# Patient Record
Sex: MALE | Race: WHITE | NOT HISPANIC OR LATINO | Employment: OTHER | URBAN - METROPOLITAN AREA
[De-identification: names, ages, dates, MRNs, and addresses within clinical notes are randomized per-mention and may not be internally consistent; named-entity substitution may affect disease eponyms.]

---

## 2018-05-03 ENCOUNTER — OFFICE VISIT (OUTPATIENT)
Dept: FAMILY MEDICINE CLINIC | Facility: CLINIC | Age: 73
End: 2018-05-03
Payer: MEDICARE

## 2018-05-03 VITALS
RESPIRATION RATE: 16 BRPM | HEART RATE: 72 BPM | HEIGHT: 66 IN | BODY MASS INDEX: 27.19 KG/M2 | WEIGHT: 169.2 LBS | SYSTOLIC BLOOD PRESSURE: 124 MMHG | DIASTOLIC BLOOD PRESSURE: 62 MMHG

## 2018-05-03 DIAGNOSIS — H25.89 OTHER AGE-RELATED CATARACT OF BOTH EYES: Primary | Chronic | ICD-10-CM

## 2018-05-03 PROCEDURE — 99204 OFFICE O/P NEW MOD 45 MIN: CPT | Performed by: FAMILY MEDICINE

## 2018-05-03 PROCEDURE — 93000 ELECTROCARDIOGRAM COMPLETE: CPT | Performed by: FAMILY MEDICINE

## 2018-05-03 NOTE — PROGRESS NOTES
Assessment/Plan:    Other age-related cataract  John Burroughs is healthy on exam   He is to f/u in 3 months for a Wellness Exam, or sooner PRN  He is cleared medically for his surgeries, and paperwork will be faxed to his Ophthalmologist          Diagnoses and all orders for this visit:    Other age-related cataract of both eyes  -     POCT ECG          Subjective:      Patient ID: Abad Gray is a 71 y o  male  New pt to clinic today for pre-op visit -> having bilateral cataracts - right eye on 5/10/18, Left eye 5/31/18 with Dr Italo Bautista of Ophthalmology  No reported medical issues  Also has macular folds - followed by [facility]  No CP / SOB when walking 4 city blocks, or when going up and down 2 flights of stairs  The following portions of the patient's history were reviewed and updated as appropriate: allergies, current medications, past family history, past social history, past surgical history and problem list     History reviewed  No pertinent past medical history  Past Surgical History:   Procedure Laterality Date    APPENDECTOMY      VASECTOMY      WISDOM TOOTH EXTRACTION       No current outpatient prescriptions on file  No Known Allergies    Family History   Problem Relation Age of Onset    No Known Problems Mother     Heart failure Father      Social History   Substance Use Topics    Smoking status: Never Smoker    Smokeless tobacco: Never Used    Alcohol use Yes      Comment: social         Review of Systems   Constitutional: Negative for activity change  Eyes: Positive for visual disturbance  Respiratory: Negative for shortness of breath  Cardiovascular: Negative for chest pain  Gastrointestinal: Negative for abdominal pain and blood in stool           Objective:      /62 (BP Location: Left arm, Patient Position: Sitting, Cuff Size: Standard)   Pulse 72   Resp 16   Ht 5' 6 42" (1 687 m)   Wt 76 7 kg (169 lb 3 2 oz)   BMI 26 97 kg/m² Physical Exam   Constitutional: He is oriented to person, place, and time  He appears well-developed and well-nourished  No distress  HENT:   Head: Normocephalic and atraumatic  Right Ear: Hearing, tympanic membrane, external ear and ear canal normal    Left Ear: Hearing, tympanic membrane, external ear and ear canal normal    Mouth/Throat: Oropharynx is clear and moist  No oropharyngeal exudate  Eyes: Conjunctivae, EOM and lids are normal  Pupils are equal, round, and reactive to light  +Apparent yellowing of the lens / cataracts bilaterally  Neck: Normal range of motion  Neck supple  No thyromegaly present  Cardiovascular: Normal rate, regular rhythm and normal heart sounds  Exam reveals no gallop and no friction rub  No murmur heard  Pulmonary/Chest: Effort normal and breath sounds normal  No respiratory distress  He has no wheezes  He has no rales  Abdominal: Soft  Bowel sounds are normal  He exhibits no distension and no mass  There is no tenderness  There is no rebound and no guarding  Lymphadenopathy:     He has no cervical adenopathy  Neurological: He is alert and oriented to person, place, and time  Skin: He is not diaphoretic  Psychiatric: He has a normal mood and affect  His behavior is normal  Judgment and thought content normal    Nursing note and vitals reviewed

## 2018-05-03 NOTE — ASSESSMENT & PLAN NOTE
Israel Hines is healthy on exam   He is to f/u in 3 months for a Wellness Exam, or sooner PRN    He is cleared medically for his surgeries, and paperwork will be faxed to his Ophthalmologist

## 2021-02-13 DIAGNOSIS — Z23 ENCOUNTER FOR IMMUNIZATION: ICD-10-CM

## 2022-06-08 ENCOUNTER — ESTABLISHED COMPREHENSIVE EXAM (OUTPATIENT)
Dept: URBAN - METROPOLITAN AREA CLINIC 6 | Facility: CLINIC | Age: 77
End: 2022-06-08

## 2022-06-08 DIAGNOSIS — Z98.890: ICD-10-CM

## 2022-06-08 DIAGNOSIS — Z96.1: ICD-10-CM

## 2022-06-08 PROCEDURE — 92014 COMPRE OPH EXAM EST PT 1/>: CPT

## 2022-06-08 ASSESSMENT — TONOMETRY
OS_IOP_MMHG: 21
OD_IOP_MMHG: 16

## 2022-06-08 ASSESSMENT — VISUAL ACUITY
OU_CC: J1
OD_CC: 20/50-1
OS_CC: 20/40-1

## 2023-06-08 ENCOUNTER — ESTABLISHED COMPREHENSIVE EXAM (OUTPATIENT)
Dept: URBAN - METROPOLITAN AREA CLINIC 6 | Facility: CLINIC | Age: 78
End: 2023-06-08

## 2023-06-08 DIAGNOSIS — Z96.1: ICD-10-CM

## 2023-06-08 DIAGNOSIS — Z98.890: ICD-10-CM

## 2023-06-08 DIAGNOSIS — H33.312: ICD-10-CM

## 2023-06-08 PROCEDURE — 92014 COMPRE OPH EXAM EST PT 1/>: CPT

## 2023-06-08 ASSESSMENT — VISUAL ACUITY
OS_CC: J2
OS_CC: 20/30-2
OD_CC: J2
OD_CC: 20/50-1

## 2023-06-08 ASSESSMENT — TONOMETRY
OD_IOP_MMHG: 18
OS_IOP_MMHG: 21

## 2024-06-19 ENCOUNTER — ESTABLISHED COMPREHENSIVE EXAM (OUTPATIENT)
Dept: URBAN - METROPOLITAN AREA CLINIC 6 | Facility: CLINIC | Age: 79
End: 2024-06-19

## 2024-06-19 DIAGNOSIS — H33.312: ICD-10-CM

## 2024-06-19 DIAGNOSIS — Z96.1: ICD-10-CM

## 2024-06-19 DIAGNOSIS — Z98.890: ICD-10-CM

## 2024-06-19 PROCEDURE — 92014 COMPRE OPH EXAM EST PT 1/>: CPT

## 2024-06-19 PROCEDURE — 92250 FUNDUS PHOTOGRAPHY W/I&R: CPT

## 2024-06-19 ASSESSMENT — TONOMETRY
OD_IOP_MMHG: 16
OS_IOP_MMHG: 18

## 2024-06-19 ASSESSMENT — VISUAL ACUITY
OS_CC: 20/40+2
OD_PH: 20/30-2
OD_CC: 20/40-1
OU_CC: J1